# Patient Record
Sex: FEMALE | Race: WHITE | HISPANIC OR LATINO | Employment: UNEMPLOYED | URBAN - METROPOLITAN AREA
[De-identification: names, ages, dates, MRNs, and addresses within clinical notes are randomized per-mention and may not be internally consistent; named-entity substitution may affect disease eponyms.]

---

## 2017-01-24 ENCOUNTER — PROCEDURE VISIT (OUTPATIENT)
Dept: OBSTETRICS AND GYNECOLOGY | Facility: CLINIC | Age: 37
End: 2017-01-24
Payer: COMMERCIAL

## 2017-01-24 ENCOUNTER — OFFICE VISIT (OUTPATIENT)
Dept: OBSTETRICS AND GYNECOLOGY | Facility: CLINIC | Age: 37
End: 2017-01-24
Payer: COMMERCIAL

## 2017-01-24 VITALS
SYSTOLIC BLOOD PRESSURE: 106 MMHG | BODY MASS INDEX: 18.04 KG/M2 | HEIGHT: 61 IN | WEIGHT: 95.56 LBS | DIASTOLIC BLOOD PRESSURE: 68 MMHG

## 2017-01-24 DIAGNOSIS — N92.6 MISSED MENSES: ICD-10-CM

## 2017-01-24 DIAGNOSIS — Z01.419 ENCOUNTER FOR GYNECOLOGICAL EXAMINATION: ICD-10-CM

## 2017-01-24 DIAGNOSIS — Z3A.01 6 WEEKS GESTATION OF PREGNANCY: ICD-10-CM

## 2017-01-24 DIAGNOSIS — Z11.3 SCREENING FOR STD (SEXUALLY TRANSMITTED DISEASE): ICD-10-CM

## 2017-01-24 DIAGNOSIS — Z01.419 WELL FEMALE EXAM WITH ROUTINE GYNECOLOGICAL EXAM: Primary | ICD-10-CM

## 2017-01-24 DIAGNOSIS — Z32.01 ENCOUNTER FOR PREGNANCY TEST, RESULT POSITIVE: ICD-10-CM

## 2017-01-24 LAB
B-HCG UR QL: POSITIVE
CTP QC/QA: YES

## 2017-01-24 PROCEDURE — 88175 CYTOPATH C/V AUTO FLUID REDO: CPT

## 2017-01-24 PROCEDURE — 99385 PREV VISIT NEW AGE 18-39: CPT | Mod: S$GLB,,, | Performed by: OBSTETRICS & GYNECOLOGY

## 2017-01-24 PROCEDURE — 87591 N.GONORRHOEAE DNA AMP PROB: CPT

## 2017-01-24 PROCEDURE — 99203 OFFICE O/P NEW LOW 30 MIN: CPT | Mod: PN | Performed by: OBSTETRICS & GYNECOLOGY

## 2017-01-24 PROCEDURE — 81025 URINE PREGNANCY TEST: CPT | Mod: S$GLB,,, | Performed by: OBSTETRICS & GYNECOLOGY

## 2017-01-24 RX ORDER — VITAMIN MINERAL SUPPLEMENT 130; 92.4; 210; 1; 5; 5; 20; 7; 25; 300; 10; 800; 6.9; 1.3; 18.2 MG/1; MG/1; MG/1; MG/1; MG/1; MG/1; MG/1; MG/1; MG/1; UG/1; UG/1; UG/1; MG/1; MG/1; MG/1
CAPSULE ORAL
COMMUNITY
Start: 2017-01-11 | End: 2017-01-24 | Stop reason: SDUPTHER

## 2017-01-24 RX ORDER — HYDROCODONE BITARTRATE AND ACETAMINOPHEN 5; 325 MG/1; MG/1
TABLET ORAL
COMMUNITY
Start: 2016-11-19 | End: 2017-01-24

## 2017-01-24 RX ORDER — METHYLPREDNISOLONE 4 MG/1
TABLET ORAL
COMMUNITY
Start: 2016-12-05 | End: 2017-01-24

## 2017-01-24 RX ORDER — BUPROPION HYDROCHLORIDE 100 MG/1
TABLET, EXTENDED RELEASE ORAL
COMMUNITY
Start: 2017-01-11 | End: 2017-01-24

## 2017-01-24 RX ORDER — ALBUTEROL SULFATE 90 UG/1
AEROSOL, METERED RESPIRATORY (INHALATION)
COMMUNITY
Start: 2016-12-05 | End: 2017-01-24

## 2017-01-24 NOTE — LETTER
January 24, 2017      Catie Thompson MD  [address]  [facility]  [place] [address]           [facility]  [address]  [place] [address]  Phone: [phone]  Fax: [phone]          Patient: Lynne Webb   MR Number: [id number]   YOB: 1980   Date of Visit: 1/24/2017       Dear Dr. Catie Thompson:    Thank you for referring Lynne Webb to me for evaluation. Attached you will find relevant portions of my assessment and plan of care.    If you have questions, please do not hesitate to call me. I look forward to following Lynne Webb along with you.    Sincerely,    Indiana Oliver MD    Enclosure  CC:  No Recipients    If you would like to receive this communication electronically, please contact [email or web address] or [phone] to request more information on [employer] Link access.    For providers and/or their staff who would like to refer a patient to [facility], please contact us through our one-stop-shop provider referral line, [facility], at [phone].    If you feel you have received this communication in error or would no longer like to receive these types of communications, please e-mail [email or web address]

## 2017-01-24 NOTE — PROGRESS NOTES
Subjective:       Patient ID: Lynne Webb is a 36 y.o. female.    Chief Complaint:  Establish Care (pt received + UPT at home, last pap  normal per pt)      History of Present Illness.  Lynne Webb is a 36 y.o. female.  She has no breast or urinary symptoms.  She has no menorrhagia, bleeding betweeen menses, postcoital bleeding, dysmenorrhea, pelvic pain, dyspareunia, vaginal dryness, vaginal discharge, or sexual complaints.  EDC 17 EGA 6 2/7 weeks by period c/w U/S today.    GYN & OB History  Patient's last menstrual period was 2016 (approximate).   Date of Last Pap: No result found    OB History    Para Term  AB SAB TAB Ectopic Multiple Living   2 1 1       1      # Outcome Date GA Lbr José Miguel/2nd Weight Sex Delivery Anes PTL Lv   2 Current            1 Term  37w0d  2.637 kg (5 lb 13 oz) M Vag-Spont None  Y      Complications: Postpartum hemorrhage      Obstetric Comments   Postpartum depression - baby had bacteremia       Past Medical History   Diagnosis Date    Abnormal Pap smear of cervix      only abnormal 1 time      Past Surgical History   Procedure Laterality Date    [place] tooth extraction       Family History   Problem Relation Age of Onset    Stroke Maternal Grandmother     Coronary artery disease Maternal Grandmother     Hypertension Father     Heart attack Father     Hypertension Mother     Stroke Maternal Grandfather     Breast cancer Neg Hx     Colon cancer Neg Hx     Ovarian cancer Neg Hx      Social History   Substance Use Topics    Smoking status: Never Smoker    Smokeless tobacco: None    Alcohol use No       Current Outpatient Prescriptions:     PNV #15-iron fum,ps-folic acid (FOLIVANE-OB) 85-1 mg Cap, Take 1 tablet by mouth once daily., Disp: 30 capsule, Rfl: 11    Review of patient's allergies indicates:   Allergen Reactions    Shellfish containing products Swelling       Review of Systems  Review of Systems  "  Constitutional: Negative for fatigue.   HENT: Negative for trouble swallowing.    Eyes: Negative for visual disturbance.   Respiratory: Negative for cough and shortness of breath.    Cardiovascular: Negative for chest pain.   Gastrointestinal: Negative for abdominal distention, abdominal pain, blood in stool, nausea and vomiting.   Genitourinary: Negative for difficulty urinating, dyspareunia, dysuria, flank pain, frequency, hematuria, pelvic pain, urgency, vaginal bleeding, vaginal discharge and vaginal pain.   Musculoskeletal: Negative for arthralgias.   Skin: Negative for rash.   Neurological: Negative for dizziness and headaches.   Psychiatric/Behavioral: Negative for sleep disturbance. The patient is not nervous/anxious.         Objective:     Vitals:    01/24/17 0953   BP: 106/68   Weight: 43.3 kg (95 lb 9.1 oz)   Height: 5' 1" (1.549 m)   PainSc: 0-No pain     Body mass index is 18.06 kg/(m^2).      Physical Exam:   Constitutional: She is oriented to person, place, and time. Vital signs are normal. She appears well-developed and well-nourished.    HENT:   Head: Normocephalic.     Neck: Normal range of motion. No thyromegaly present.     Pulmonary/Chest: Right breast exhibits no mass, no nipple discharge, no skin change, no tenderness and no swelling. Left breast exhibits no mass, no nipple discharge, no skin change, no tenderness and no swelling. Breasts are symmetrical.        Abdominal: Soft. Normal appearance and bowel sounds are normal. She exhibits no distension. There is no tenderness.     Genitourinary: Vagina normal and uterus normal. Pelvic exam was performed with patient supine. There is no rash, tenderness, lesion or injury on the right labia. There is no rash, tenderness, lesion or injury on the left labia. Cervix is normal. Right adnexum displays no mass, no tenderness and no fullness. Left adnexum displays no mass, no tenderness and no fullness. No erythema in the vagina. No vaginal discharge " found. Cervix exhibits no motion tenderness and no discharge.           Musculoskeletal: Normal range of motion.      Lymphadenopathy:        Right: No inguinal and no supraclavicular adenopathy present.        Left: No inguinal and no supraclavicular adenopathy present.    Neurological: She is alert and oriented to person, place, and time.    Skin: Skin is warm and dry.    Psychiatric: She has a normal mood and affect.        Assessment/ Plan:     Well female exam with routine gynecological exam  -     Liquid-based pap smear, screening  -     C. trachomatis/N. gonorrhoeae by AMP DNA Cervicovaginal    Missed menses  -     POCT urine pregnancy  -     US OB/GYN Procedure ([facility]) - Extended List; Future  -     PNV #15-iron fum,ps-folic acid (FOLIVANE-OB) 85-1 mg Cap; Take 1 tablet by mouth once daily.  Dispense: 30 capsule; Refill: 11    Screening for STD (sexually transmitted disease)  -     Liquid-based pap smear, screening  -     C. trachomatis/N. gonorrhoeae by AMP DNA Cervicovaginal    Encounter for gynecological examination    Encounter for pregnancy test, result positive    6 weeks gestation of pregnancy    Declines CF.  Will consider Materna 21  Labs next visit.  U/S for dates today.  Routine pap smears.  Self breast exam  Follow-up with me in 1 month

## 2017-01-24 NOTE — MR AVS SNAPSHOT
[facility]  [address]  [place] [address]  Phone: [phone]  Fax: [phone]                  Lynne Webb   2017 9:45 AM   Office Visit    Description:  Female : 1980   Provider:  Indiana Oliver MD   Department:  [facility]           Reason for Visit     Establish Care           Diagnoses this Visit        Comments    Well female exam with routine gynecological exam    -  Primary     Missed menses         Screening for STD (sexually transmitted disease)         Encounter for gynecological examination         Encounter for pregnancy test, result positive         6 weeks gestation of pregnancy                To Do List           Future Appointments        Provider Department Dept Phone    2017 10:15 AM Indiana Oliver MD [facility] [phone]    3/22/2017 10:15 AM Indiana Oliver MD [facility] [phone]      Goals (5 Years of Data)     None      Follow-Up and Disposition     Return in about 1 month (around 2017).    Follow-up and Disposition History       These Medications        Disp Refills Start End    PNV #15-iron fum,ps-folic acid (FOLIVANE-OB) 85-1 mg Cap 30 capsule 11 2017    Take 1 tablet by mouth once daily. - Oral    Pharmacy: [facility] #[id number] - [place] [address] #: [phone]         [facility] On Call     [facility] On Call Nurse Care Line -  Assistance  Registered nurses in the [facility] On Call Center provide clinical advisement, health education, appointment booking, and other advisory services.  Call for this free service at [phone].             Medications           Message regarding Medications     Verify the changes and/or additions to your medication regime listed below are the same as discussed with your clinician today.  If any of these changes or additions are incorrect, please notify your healthcare provider.       "  START taking these NEW medications        Refills    PNV #15-iron fum,ps-folic acid (FOLIVANE-OB) 85-1 mg Cap 11    Sig: Take 1 tablet by mouth once daily.    Class: Normal    Route: Oral      STOP taking these medications     buPROPion (WELLBUTRIN SR) 100 MG TBSR 12 hr tablet     hydrocodone-acetaminophen 5-325mg (NORCO) 5-325 mg per tablet     methylPREDNISolone (MEDROL DOSEPACK) 4 mg tablet     PROAIR HFA 90 mcg/actuation inhaler            Verify that the below list of medications is an accurate representation of the medications you are currently taking.  If none reported, the list may be blank. If incorrect, please contact your healthcare provider. Carry this list with you in case of emergency.           Current Medications     PNV #15-iron fum,ps-folic acid (FOLIVANE-OB) 85-1 mg Cap Take 1 tablet by mouth once daily.           Clinical Reference Information           Vital Signs - Last Recorded  Most recent update: 1/24/2017  9:55 AM by Adelia Dejesus MA    BP Ht Wt LMP BMI    106/68 5' 1" (1.549 m) 43.3 kg (95 lb 9.1 oz) 12/11/2016 (Approximate) 18.06 kg/m2      Blood Pressure          Most Recent Value    BP  106/68      Allergies as of 1/24/2017     Shellfish Containing Products      Immunizations Administered on Date of Encounter - 1/24/2017     None      Orders Placed During Today's Visit      Normal Orders This Visit    C. trachomatis/N. gonorrhoeae by AMP DNA Cervicovaginal     Liquid-based pap smear, screening     POCT urine pregnancy     Future Labs/Procedures Expected by Expires    US OB/GYN Procedure ([facility]) - Extended List  As directed 1/24/2018 1/24/2017 10:11 AM - Adelia Dejesus MA      Component Results     Component Value Flag Ref Range Units Status    POC Preg Test, Ur Positive (A) Negative  Final     Acceptable Yes    Final      "

## 2017-01-24 NOTE — MR AVS SNAPSHOT
[facility]  [address]  [place] [address]  Phone: [phone]  Fax: [phone]                  Lynne Webb   2017 11:30 AM   Procedure visit    Description:  Female : 1980   Provider:  [facility]   Department:  [facility]           Diagnoses this Visit        Comments    Missed menses                To Do List           Future Appointments        Provider Department Dept Phone    2017 10:15 AM Indiana Oliver MD [facility] [phone]    3/22/2017 10:15 AM Indiana Oliver MD [facility] [phone]      Goals (5 Years of Data)     None      [facility] On Call     [facility] On Call Nurse [facility] Line -  Assistance  Registered nurses in the [facility] On Call Center provide clinical advisement, health education, appointment booking, and other advisory services.  Call for this free service at [phone].             Medications           Message regarding Medications     Verify the changes and/or additions to your medication regime listed below are the same as discussed with your clinician today.  If any of these changes or additions are incorrect, please notify your healthcare provider.             Verify that the below list of medications is an accurate representation of the medications you are currently taking.  If none reported, the list may be blank. If incorrect, please contact your healthcare provider. Carry this list with you in case of emergency.           Current Medications     PNV #15-iron fum,ps-folic acid (FOLIVANE-OB) 85-1 mg Cap Take 1 tablet by mouth once daily.           Clinical Reference Information           Vital Signs - Last Recorded     LMP                   2016 (Approximate)           Allergies as of 2017     Shellfish Containing Products      Immunizations Administered on Date of Encounter - 2017     None      Orders Placed During Today's Visit       Normal Orders This Visit    US OB/GYN Procedure ([facility]) - Extended List

## 2017-01-26 LAB
C TRACH DNA SPEC QL NAA+PROBE: NEGATIVE
N GONORRHOEA DNA SPEC QL NAA+PROBE: NEGATIVE

## 2017-01-31 ENCOUNTER — TELEPHONE (OUTPATIENT)
Dept: OBSTETRICS AND GYNECOLOGY | Facility: CLINIC | Age: 37
End: 2017-01-31

## 2017-01-31 DIAGNOSIS — B37.49 CANDIDIASIS OF UROGENITAL SITES: Primary | ICD-10-CM

## 2017-01-31 RX ORDER — TERCONAZOLE 8 MG/G
1 CREAM VAGINAL NIGHTLY
Qty: 20 G | Refills: 0 | Status: SHIPPED | OUTPATIENT
Start: 2017-01-31 | End: 2017-02-03

## 2017-01-31 NOTE — TELEPHONE ENCOUNTER
Informed pt of results and gave instructions for Rx. Pt verbalized understanding.   ----- Message from Indiana Oliver MD sent at 1/31/2017  4:14 PM CST -----  Call patient and tell her that her Pap smear and cultures are normal.  The pap did show yeast so I will send a prescription for terazol to use vaginally x 3 days.

## 2017-02-08 ENCOUNTER — TELEPHONE (OUTPATIENT)
Dept: OBSTETRICS AND GYNECOLOGY | Facility: CLINIC | Age: 37
End: 2017-02-08

## 2017-02-08 NOTE — TELEPHONE ENCOUNTER
Left detailed message that I spoke with Dr. Sanchez who recommends to just monitor, advised if the bleeding increases or she starts cramping to call back for an appt.

## 2017-02-08 NOTE — TELEPHONE ENCOUNTER
8 week OB  Has been using Terazol cream but stopped for 2 days when she left town.  She noticed brownish discharge when she stopped using the cream but after intercourse she had scant bright red vaginal bleeding.  No more red blood noted this AM just scant brown discharge.  No cramping.  Reassured her that spotting after intercourse is normal in pregnancy.  Offered appt with iveth, she doesn't want to come if she doesn't have to.

## 2017-02-11 PROCEDURE — 81025 URINE PREGNANCY TEST: CPT | Performed by: EMERGENCY MEDICINE

## 2017-02-11 PROCEDURE — 99284 EMERGENCY DEPT VISIT MOD MDM: CPT | Mod: 25

## 2017-02-12 ENCOUNTER — HOSPITAL ENCOUNTER (EMERGENCY)
Facility: OTHER | Age: 37
Discharge: HOME | End: 2017-02-12
Attending: EMERGENCY MEDICINE
Payer: COMMERCIAL

## 2017-02-12 VITALS
RESPIRATION RATE: 16 BRPM | BODY MASS INDEX: 17.75 KG/M2 | WEIGHT: 94 LBS | OXYGEN SATURATION: 99 % | DIASTOLIC BLOOD PRESSURE: 68 MMHG | HEART RATE: 80 BPM | TEMPERATURE: 98 F | SYSTOLIC BLOOD PRESSURE: 120 MMHG | HEIGHT: 61 IN

## 2017-02-12 DIAGNOSIS — O20.0 THREATENED ABORTION: Primary | ICD-10-CM

## 2017-02-12 LAB
ABO + RH BLD: NORMAL
B-HCG UR QL: POSITIVE
BILIRUB UR QL STRIP: NEGATIVE
BLD GP AB SCN CELLS X3 SERPL QL: NORMAL
CLARITY UR: CLEAR
COLOR UR: YELLOW
CTP QC/QA: YES
GLUCOSE UR QL STRIP: NEGATIVE
HCG INTACT+B SERPL-ACNC: NORMAL MIU/ML
HGB UR QL STRIP: ABNORMAL
INJECT RH IG VOL PATIENT: NORMAL ML
KETONES UR QL STRIP: NEGATIVE
LEUKOCYTE ESTERASE UR QL STRIP: ABNORMAL
MICROSCOPIC COMMENT: ABNORMAL
NITRITE UR QL STRIP: NEGATIVE
PH UR STRIP: 7 [PH] (ref 5–8)
PROT UR QL STRIP: NEGATIVE
RBC #/AREA URNS HPF: 3 /HPF (ref 0–4)
RH BLD: NORMAL
SP GR UR STRIP: <=1.005 (ref 1–1.03)
SQUAMOUS #/AREA URNS HPF: 5 /HPF
URN SPEC COLLECT METH UR: ABNORMAL
UROBILINOGEN UR STRIP-ACNC: NEGATIVE EU/DL
WBC #/AREA URNS HPF: 12 /HPF (ref 0–5)

## 2017-02-12 PROCEDURE — 86901 BLOOD TYPING SEROLOGIC RH(D): CPT

## 2017-02-12 PROCEDURE — 81000 URINALYSIS NONAUTO W/SCOPE: CPT

## 2017-02-12 PROCEDURE — 96372 THER/PROPH/DIAG INJ SC/IM: CPT

## 2017-02-12 PROCEDURE — 86900 BLOOD TYPING SEROLOGIC ABO: CPT

## 2017-02-12 PROCEDURE — 63600519 RHOGAM PHARM REV CODE 636 ALT 250 W HCPCS: Performed by: EMERGENCY MEDICINE

## 2017-02-12 PROCEDURE — 84702 CHORIONIC GONADOTROPIN TEST: CPT

## 2017-02-12 PROCEDURE — 86850 RBC ANTIBODY SCREEN: CPT

## 2017-02-12 RX ADMIN — HUMAN RHO(D) IMMUNE GLOBULIN 300 MCG: 300 INJECTION, SOLUTION INTRAMUSCULAR at 01:02

## 2017-02-12 NOTE — ED AVS SNAPSHOT
[facility]  [address]  [facility] [address]               Lynne Webb   2017 12:18 AM   ED    Description:  Female : 1980   Department:  [facility]           Your Care was Coordinated By:     Provider Role From To    Joshua Sheriff DO Attending Provider 17 0029 --      Reason for Visit     Vaginal Bleeding           Diagnoses this Visit        Comments    Threatened     -  Primary       ED Disposition     None           To Do List           Follow-up Information     Follow up with Indiana Oliver MD. Call in 1 day.    Specialties:  Obstetrics, Obstetrics and Gynecology    Contact information:    [address]  [address]  [facility] [address]  [phone]        [facility] On Call     [facility] On Call Nurse Care Line -  Assistance  Registered nurses in the [facility] On Call Center provide clinical advisement, health education, appointment booking, and other advisory services.  Call for this free service at [phone].             Medications           Message regarding Medications     Verify the changes and/or additions to your medication regime listed below are the same as discussed with your clinician today.  If any of these changes or additions are incorrect, please notify your healthcare provider.        These medications were administered today        Dose Freq    rho(d) immune globulin injection 300 mcg 300 mcg As needed (PRN)    Sig: Inject 2 mLs (300 mcg total) into the muscle as needed (Administer Rh Immune Globulin if the mother is Rh negative and the baby is Rh positive or unknown.).    Class: Normal    Route: Intramuscular           Verify that the below list of medications is an accurate representation of the medications you are currently taking.  If none reported, the list may be blank. If incorrect, please contact your healthcare provider. Carry this list with you in case of emergency.     "       Current Medications     PNV #15-iron fum,ps-folic acid (FOLIVANE-OB) 85-1 mg Cap Take 1 tablet by mouth once daily.    rho(d) immune globulin injection 300 mcg Inject 2 mLs (300 mcg total) into the muscle as needed (Administer Rh Immune Globulin if the mother is Rh negative and the baby is Rh positive or unknown.).           Clinical Reference Information           Your Vitals Were     BP Pulse Temp Resp Height Weight    117/67 (BP Location: Left arm, Patient Position: Sitting) 75 98 °F (36.7 °C) (Oral) 16 5' 1" (1.549 m) 42.6 kg (94 lb)    Last Period SpO2 BMI          2016 (Approximate) 100% 17.76 kg/m2        Allergies as of 2017        Reactions    Shellfish Containing Products Swelling      Immunizations Administered on Date of Encounter - 2017     Name Date Dose VIS Date Route    Rho (D) Immune Globulin - IM  Incomplete 300 mcg n/a Intramuscular      ED Micro, Lab, POCT     Start Ordered       Status Ordering Provider    17 0013 17 0012  hCG, quantitative, pregnancy  STAT      Final result     17 0013 17 0012  Urinalysis  STAT      Final result     17 0012 17 0012  Urinalysis Microscopic  Once      Final result     17 2329 17 2328  POCT urine pregnancy  Once      Final result       ED Imaging Orders     None      Discharge References/Attachments     POSSIBLE MISCARRIAGE (THREATENED ) (ENGLISH)      Your Scheduled Appointments     2017 10:15 AM CST   Routine Prenatal Visit with Indiana Oliver MD   [facility]    [address]  [place] [address]   [phone]            Mar 22, 2017 10:15 AM CDT   Routine Prenatal Visit with MD Nishant Askew  [facility]    [address]  [place] [address]   [phone]            2017 10:15 AM CDT   Routine Prenatal Visit with Indiana Oliver MD "   [facility] [place]    [address]  [place] [address] [phone]               [facility] complies with applicable Federal civil rights laws and does not discriminate on the basis of race, color, national origin, age, disability, or sex.        Language Assistance Services     ATTENTION: Language assistance services are available, free of charge. Please call [phone].      ATENCIÓN: Si habla español, tiene a narvaez disposición servicios gratuitos de asistencia lingüística. Llame al [phone].     CHÚ Ý: N?u b?n nói Ti?ng Vi?t, có các d?ch v? h? tr? ngôn ng? mi?n phí dành cho b?n. G?i s? [phone].

## 2017-02-12 NOTE — ED TRIAGE NOTES
Pt c/o vaginal bleeding about an hour PTA.  States she is 8 weeks pregnant.  Reports some cramping.  States she used one pad since bleeding started.  Reports bright red blood.

## 2017-02-12 NOTE — ED PROVIDER NOTES
"Encounter Date: 2017    SCRIBE #1 NOTE: I, Blanca Alvarez, am scribing for, and in the presence of, Dr. Sheriff.       History     Chief Complaint   Patient presents with    Vaginal Bleeding     about 8 weeks pregnant, started bleeding at about 8:30 some cramping     Review of patient's allergies indicates:   Allergen Reactions    Shellfish containing products Swelling     HPI Comments:   Time seen by provider: 12:36 AM    The patient is a 36 y.o. female with  who presents to the ED 8 weeks pregnant with an acute onset of vaginal bleeding. The symptoms began 4 hours ago. She described the bleeding as "a little like a period but more than spotting." The patient took 2 Tylenol with no relief in symptoms. This is the first bleeding during her pregnancy. She has a 1 y.o. that she picks up. Otherwise, she denies any heavy lifting. She was recently prescribed a vaginal cream for a yeast infection where she saw some spotting for the first time but not as significant as this. Her ObGyn is Dr. Indiana Oliver, whom she contacted prior and referred her to the ER. The patient denies any other symptoms at this time. No pertinent SHx noted. No known drug allergies noted.    The history is provided by the patient.     Past Medical History   Diagnosis Date    Abnormal Pap smear of cervix      only abnormal 1 time      No past medical history pertinent negatives.  Past Surgical History   Procedure Laterality Date    [place] tooth extraction       Family History   Problem Relation Age of Onset    Stroke Maternal Grandmother     Coronary artery disease Maternal Grandmother     Hypertension Father     Heart attack Father     Hypertension Mother     Stroke Maternal Grandfather     Breast cancer Neg Hx     Colon cancer Neg Hx     Ovarian cancer Neg Hx      Social History   Substance Use Topics    Smoking status: Never Smoker    Smokeless tobacco: None    Alcohol use No     Review of Systems "   Constitutional: Negative for chills and fever.   HENT: Negative for congestion, rhinorrhea, sneezing and sore throat.    Eyes: Negative for visual disturbance.   Respiratory: Negative for cough and shortness of breath.    Cardiovascular: Negative for chest pain and palpitations.   Gastrointestinal: Negative for abdominal pain, diarrhea, nausea and vomiting.   Genitourinary: Negative for dysuria and hematuria.   Musculoskeletal: Negative for back pain and neck pain.   Skin: Negative for rash.   Neurological: Negative for seizures, syncope and headaches.     Physical Exam   Initial Vitals   BP Pulse Resp Temp SpO2   02/11/17 2319 02/11/17 2319 02/11/17 2319 02/11/17 2319 02/11/17 2319   117/67 75 16 98 °F (36.7 °C) 100 %     Physical Exam    Nursing note and vitals reviewed.  Constitutional: She appears well-developed and well-nourished.  Non-toxic appearance. She does not have a sickly appearance. No distress.   HENT:   Head: Normocephalic and atraumatic.   Mouth/Throat: Oropharynx is clear and moist.   Eyes: Conjunctivae, EOM and lids are normal. Pupils are equal, round, and reactive to light. Right eye exhibits no nystagmus. Left eye exhibits no nystagmus.   Neck: Trachea normal, normal range of motion and phonation normal. Neck supple.   Cardiovascular: Normal rate, regular rhythm and normal heart sounds. Exam reveals no gallop and no friction rub.    No murmur heard.  Abdominal: Soft. Normal appearance and bowel sounds are normal. There is no tenderness. There is no rigidity, no rebound, no guarding, no tenderness at McBurney's point and negative Augustin's sign.   Genitourinary:   Genitourinary Comments: Mild amount of bleeding from os. Os is close. No POC.    Neurological: She is alert and oriented to person, place, and time. She has normal strength and normal reflexes. She displays normal reflexes. No cranial nerve deficit or sensory deficit. She displays a negative Romberg sign.   Skin: Skin is warm, dry and  intact. No rash noted. No pallor.       ED Course   Procedures  Labs Reviewed   URINALYSIS - Abnormal; Notable for the following:        Result Value    Specific Gravity, UA <=1.005 (*)     Occult Blood UA 3+ (*)     Leukocytes, UA Trace (*)     All other components within normal limits   URINALYSIS MICROSCOPIC - Abnormal; Notable for the following:     WBC, UA 12 (*)     All other components within normal limits   POCT URINE PREGNANCY - Abnormal; Notable for the following:     POC Preg Test, Ur Positive (*)     All other components within normal limits   HCG, QUANTITATIVE, PREGNANCY   TYPE & SCREEN   RH TYPING   POST PARTUM TYPE AND SCREEN   CARLY - [facility] (FETAL BLEED SCREEN)   PREPARE RH IMMUNE GLOBULIN           Medical Decision Making:   History:   Old Medical Records: I decided to obtain old medical records.  Clinical Tests:   Lab Tests: Reviewed and Ordered  ED Management:  Lynne Webb is a 36 y.o. female Who is  2 para 1.  Complaining of lower abdominal cramping and vaginal bleeding.  Concern for threatened .  Patient has a previously confirm ultrasound confirming IUP.  Rh- so needs RhoGAM at this time.  Patient otherwise stable.  We'll discharge home to follow up with obstetrician.  I did speak with Dr. Santana.  I have explained to the patient about threatened  in layman's terms.  Patient discharged home in stable condition. Diagnosis and treatment plan explained to patient. I have answered all questions and the patient is satisfied with the plan of care.             Scribe Attestation:   Scribe #1: I performed the above scribed service and the documentation accurately describes the services I performed. I attest to the accuracy of the note.    Attending Attestation:           Physician Attestation for Scribe:  Physician Attestation Statement for Scribe #1: I, Dr. Sheriff, reviewed documentation, as scribed by Blanca Alvarez in my presence, and it is both accurate and  complete.         Attending ED Notes:   1:30 AM  Spoke with Dr. Hitchcock.          ED Course     Clinical Impression:     1. Threatened           Disposition:   Disposition: Discharged  Condition: Stable       Joshua Sheriff, DO  [id number]

## 2017-02-13 ENCOUNTER — PROCEDURE VISIT (OUTPATIENT)
Dept: OBSTETRICS AND GYNECOLOGY | Facility: CLINIC | Age: 37
End: 2017-02-13
Attending: OBSTETRICS & GYNECOLOGY
Payer: COMMERCIAL

## 2017-02-13 ENCOUNTER — TELEPHONE (OUTPATIENT)
Dept: OBSTETRICS AND GYNECOLOGY | Facility: CLINIC | Age: 37
End: 2017-02-13

## 2017-02-13 ENCOUNTER — LAB VISIT (OUTPATIENT)
Dept: LAB | Facility: OTHER | Age: 37
End: 2017-02-13
Attending: OBSTETRICS & GYNECOLOGY
Payer: COMMERCIAL

## 2017-02-13 VITALS — SYSTOLIC BLOOD PRESSURE: 118 MMHG | DIASTOLIC BLOOD PRESSURE: 72 MMHG | WEIGHT: 95.56 LBS | BODY MASS INDEX: 18.06 KG/M2

## 2017-02-13 DIAGNOSIS — O20.9 VAGINAL BLEEDING IN PREGNANCY, FIRST TRIMESTER: ICD-10-CM

## 2017-02-13 DIAGNOSIS — O20.9 VAGINAL BLEEDING IN PREGNANCY, FIRST TRIMESTER: Primary | ICD-10-CM

## 2017-02-13 DIAGNOSIS — O03.4 INCOMPLETE ABORTION: ICD-10-CM

## 2017-02-13 DIAGNOSIS — O03.4 INCOMPLETE ABORTION: Primary | ICD-10-CM

## 2017-02-13 LAB
BASOPHILS # BLD AUTO: 0.03 K/UL
BASOPHILS NFR BLD: 0.3 %
DIFFERENTIAL METHOD: ABNORMAL
EOSINOPHIL # BLD AUTO: 0.3 K/UL
EOSINOPHIL NFR BLD: 3.2 %
ERYTHROCYTE [DISTWIDTH] IN BLOOD BY AUTOMATED COUNT: 13.8 %
HCT VFR BLD AUTO: 35.7 %
HGB BLD-MCNC: 11.8 G/DL
LYMPHOCYTES # BLD AUTO: 2 K/UL
LYMPHOCYTES NFR BLD: 20.9 %
MCH RBC QN AUTO: 28.8 PG
MCHC RBC AUTO-ENTMCNC: 33.1 %
MCV RBC AUTO: 87 FL
MONOCYTES # BLD AUTO: 0.5 K/UL
MONOCYTES NFR BLD: 5.2 %
NEUTROPHILS # BLD AUTO: 6.8 K/UL
NEUTROPHILS NFR BLD: 70.2 %
PLATELET # BLD AUTO: 271 K/UL
PMV BLD AUTO: 9.1 FL
RBC # BLD AUTO: 4.1 M/UL
WBC # BLD AUTO: 9.74 K/UL

## 2017-02-13 PROCEDURE — 76817 TRANSVAGINAL US OBSTETRIC: CPT | Mod: S$GLB,,, | Performed by: PEDIATRICS

## 2017-02-13 PROCEDURE — 85025 COMPLETE CBC W/AUTO DIFF WBC: CPT

## 2017-02-13 PROCEDURE — 99213 OFFICE O/P EST LOW 20 MIN: CPT | Mod: S$GLB,,, | Performed by: OBSTETRICS & GYNECOLOGY

## 2017-02-13 PROCEDURE — 36415 COLL VENOUS BLD VENIPUNCTURE: CPT

## 2017-02-13 PROCEDURE — 99999 PR PBB SHADOW E&M-EST. PATIENT-LVL II: CPT | Mod: PBBFAC,,, | Performed by: OBSTETRICS & GYNECOLOGY

## 2017-02-13 NOTE — MR AVS SNAPSHOT
[facility]  [address]  [address]  [facility] [address]  Phone: [phone]  Fax: [phone]                  Lynne Webb   2017 10:45 AM   Routine Prenatal    Description:  Female : 1980   Provider:  Indiana Oliver MD   [facility]           Reason for Visit     Vaginal Bleeding           Diagnoses this Visit        Comments    Incomplete     -  Primary            To Do List           Future Appointments        Provider Department Dept Phone    2017 12:30 PM LAB, [facility] [phone]    2017 10:15 AM Indiana Oliver MD [facility] [phone]    2017 2:00 PM [facility], WOMEN'S ULTRASOUND [facility] Ultrasound [phone]    2017 3:00 PM Indiana Oliver MD [facility] [phone]    3/22/2017 10:15 AM Indiana Oliver MD [facility] [phone]      Goals (5 Years of Data)     None      Follow-Up and Disposition     Return in about 1 week (around 2017).    Follow-up and Disposition History      [facility]     [facility] Nurse Care [facility] -  Assistance  Registered nurses in the [facility] provide clinical advisement, health education, appointment booking, and other advisory services.  Call for this free service at [phone].             Medications           Message regarding Medications     Verify the changes and/or additions to your medication regime listed below are the same as discussed with your clinician today.  If any of these changes or additions are incorrect, please notify your healthcare provider.             Verify that the below list of medications is an accurate representation of the medications you are currently taking.  If none reported, the list may be blank. If incorrect, please contact your healthcare provider. Carry this list with you in case of emergency.           Current  Medications     PNV #15-iron fum,ps-folic acid (FOLIVANE-OB) 85-1 mg Cap Take 1 tablet by mouth once daily.           Clinical Reference Information           Prenatal Vitals     Enc. Date GA Prenatal Vitals Prenatal Pulse Pain Level Urine Albumin/Glucose Edema Presentation Dilation/Effacement/Station    2/13/17  118/72 / 43.3 kg (95 lb 9.1 oz)  /  / Absent  3 Negative / Negative         Your Vitals Were     BP Weight Last Period BMI       118/72 43.3 kg (95 lb 9.1 oz) 12/11/2016 (Approximate) 18.06 kg/m2       Allergies as of 2/13/2017     Shellfish Containing Products      Immunizations Administered on Date of Encounter - 2/13/2017     None      Orders Placed During Today's Visit     Future Labs/Procedures Expected by Expires    CBC auto differential  2/13/2017 2/13/2018    US Pelvis Comp with Transvag NON-OB (xpd  2/13/2017 2/13/2018      Language Assistance Services     ATTENTION: Language assistance services are available, free of charge. Please call [phone].      ATENCIÓN: Si habla español, tiene a narvaez disposición servicios gratuitos de asistencia lingüística. Llame al [phone].     LINDA Ý: N?u b?n nói Ti?ng Vi?t, có các d?ch v? h? tr? ngôn ng? mi?n phí dành cho b?n. G?i s? [phone].         [facility] complies with applicable Federal civil rights laws and does not discriminate on the basis of race, color, national origin, age, disability, or sex.

## 2017-02-13 NOTE — TELEPHONE ENCOUNTER
Melody 8wk ob pt started bleeding Saturday night and went to the ER. They told her she was ab negative and gave her rhogam. States that she is still bleeding and having some cramping as well. She says that she is bleeding a little bit more than yesterday. Scheduled her to see Dr. Oliver today at 10:45 with an U/S.

## 2017-02-13 NOTE — PROGRESS NOTES
Patient went to ER with mild bleeding on 2/11/17.  Was diagnosed with a threatened ab and sent home after being given Rhogam.  Bleeding yesterday and today is like a period. U/S today shows no FHTs.  Pelvic:  Uterus nontender.  Cervix closed.  Options of expectant management, cytotec, and D&C discessed.  They would like expectant management.  Precautions given and CBC with diff ordered.

## 2017-02-13 NOTE — PROCEDURES
Procedures       Indication: 1st trimester bleeding (GÓMEZ SWING).   Maternal age (36 years).   ____________________________________________________________________________  History:     Age: 36 years. Maternal age at EDC: 37 years. : 2 Para: 1. LMP not sure.     Obstetric History:     Mode of last delivery: Vaginal Delivery @ 37 weeks.  ____________________________________________________________________________  Dating:    LMP: 16 EDC: 17 GA by LMP: 9w1d  Current Scan on: 17 EDC: 10/08/17 GA by current scan: 6w1d      Best Overall Assessment: 17 EDC: 17 Assessed GA: 9w1d  The calculation of the gestational age by current scan was based on CRL.  The Best Overall Assessment is based on the LMP.  ____________________________________________________________________________  Early Pregnancy Assessment:    Biometry:  CRL 4.2 mm <5th% 6w1d (5w6d to 6w2d)  Gestational Sac present. Yolk Sac present. Embryo present.     Heart activity: absent.     Other Findings:     Swing notified of ultrasound findings.   Ëmbryonic demise.     ____________________________________________________________________________  Maternal Structures:    Uterus: normal.  Cervix: normal.  Right Ovary: normal.   Right Ovary size: 26 mm x 22 mm x 10 mm. Volume: 3.0 ml.   Left Ovary: normal.   Left Ovary size: 39 mm x 34 mm x 29 mm. Volume: 20.1 ml.   Corpus Luteum Left Ovary: 28 mm x 25 mm x 32 mm.  Cul de Sac / Pouch of Dhruv: no free fluid visible.  ____________________________________________________________________________  Report Summary:    Impression:     Findings given to primary OB.    No FHR noted.  C/w embryonic demise.     Recommendations:     Suggest repeat scan as you feel clinically indicated.

## 2017-02-14 ENCOUNTER — TELEPHONE (OUTPATIENT)
Dept: OBSTETRICS AND GYNECOLOGY | Facility: CLINIC | Age: 37
End: 2017-02-14

## 2017-02-14 DIAGNOSIS — O03.9 SAB (SPONTANEOUS ABORTION): Primary | ICD-10-CM

## 2017-02-14 DIAGNOSIS — R10.2 PELVIC PAIN: Primary | ICD-10-CM

## 2017-02-14 RX ORDER — OXYCODONE AND ACETAMINOPHEN 5; 325 MG/1; MG/1
1 TABLET ORAL EVERY 6 HOURS PRN
Qty: 20 TABLET | Refills: 0 | Status: SHIPPED | OUTPATIENT
Start: 2017-02-14 | End: 2018-02-14

## 2017-02-14 RX ORDER — IBUPROFEN 600 MG/1
600 TABLET ORAL EVERY 6 HOURS PRN
Qty: 60 TABLET | Refills: 1 | Status: SHIPPED | OUTPATIENT
Start: 2017-02-14

## 2017-02-14 NOTE — TELEPHONE ENCOUNTER
----- Message from Indiana Oliver MD sent at 2/14/2017 10:54 AM CST -----  Call pt.  She is only mildly anemic.  Take fergon over the counter daily

## 2017-02-14 NOTE — TELEPHONE ENCOUNTER
Informed pt that Ibuprofen and Oxycodone have been sent to the pharmacy, gave instructions for taking RX. Pt verbalized understanding.

## 2017-02-14 NOTE — TELEPHONE ENCOUNTER
Informed pt of results and instructions for OTC medication. Pt verbalized understanding. Pt states that she passed the fetus last night. Vaginal bleeding has lessened. Pt is still experiencing pain, would like to know if something can be called in for her if pain continues. Pharmacy is [facility].

## 2017-02-22 ENCOUNTER — PROCEDURE VISIT (OUTPATIENT)
Dept: OBSTETRICS AND GYNECOLOGY | Facility: CLINIC | Age: 37
End: 2017-02-22
Payer: COMMERCIAL

## 2017-02-22 ENCOUNTER — OFFICE VISIT (OUTPATIENT)
Dept: OBSTETRICS AND GYNECOLOGY | Facility: CLINIC | Age: 37
End: 2017-02-22
Payer: COMMERCIAL

## 2017-02-22 VITALS
BODY MASS INDEX: 18.62 KG/M2 | HEIGHT: 61 IN | DIASTOLIC BLOOD PRESSURE: 76 MMHG | SYSTOLIC BLOOD PRESSURE: 120 MMHG | WEIGHT: 98.63 LBS

## 2017-02-22 DIAGNOSIS — O03.9 SAB (SPONTANEOUS ABORTION): ICD-10-CM

## 2017-02-22 DIAGNOSIS — O03.9 COMPLETE ABORTION: Primary | ICD-10-CM

## 2017-02-22 PROCEDURE — 99213 OFFICE O/P EST LOW 20 MIN: CPT | Mod: S$GLB,,, | Performed by: OBSTETRICS & GYNECOLOGY

## 2017-02-22 PROCEDURE — 99999 PR PBB SHADOW E&M-EST. PATIENT-LVL III: CPT | Mod: PBBFAC,,, | Performed by: OBSTETRICS & GYNECOLOGY

## 2017-02-22 PROCEDURE — 76817 TRANSVAGINAL US OBSTETRIC: CPT | Mod: S$GLB,,, | Performed by: OBSTETRICS & GYNECOLOGY

## 2017-02-22 PROCEDURE — 1160F RVW MEDS BY RX/DR IN RCRD: CPT | Mod: S$GLB,,, | Performed by: OBSTETRICS & GYNECOLOGY

## 2017-02-22 NOTE — PROGRESS NOTES
Patient is a 36 y.o. female who is here for follow-up of incomplete .  She was bleeding the past weekend.  I saw her on 17 and there was an IUP with no FHTs.  She passed the embryo later that night.  The bleeding stopped this weekend.  Today the ultrasound shows an empty cavity with no retained products. Patient's last menstrual period was 2016 (approximate).  She is AB negative and received rhogam in ER on 17.    Past Medical History   Diagnosis Date    Abnormal Pap smear of cervix      only abnormal 1 time      Past Surgical History   Procedure Laterality Date    [place] tooth extraction       Family History   Problem Relation Age of Onset    Stroke Maternal Grandmother     Coronary artery disease Maternal Grandmother     Hypertension Father     Heart attack Father     Hypertension Mother     Stroke Maternal Grandfather     Breast cancer Neg Hx     Colon cancer Neg Hx     Ovarian cancer Neg Hx      Social History     Social History    Marital status:      Spouse name: N/A    Number of children: N/A    Years of education: N/A     Social History Main Topics    Smoking status: Never Smoker    Smokeless tobacco: None    Alcohol use No    Drug use: No    Sexual activity: Yes     Partners: Male     Birth control/ protection: None     Other Topics Concern    None     Social History Narrative     Review of patient's allergies indicates:   Allergen Reactions    Shellfish containing products Swelling     Current Outpatient Prescriptions on File Prior to Visit   Medication Sig Dispense Refill    ibuprofen (ADVIL,MOTRIN) 600 MG tablet Take 1 tablet (600 mg total) by mouth every 6 (six) hours as needed for Pain. 60 tablet 1    PNV #15-iron fum,ps-folic acid (FOLIVANE-OB) 85-1 mg Cap Take 1 tablet by mouth once daily. 30 capsule 11    oxycodone-acetaminophen (ROXICET) 5-325 mg per tablet Take 1 tablet by mouth every 6 (six) hours as needed for Pain. 20 tablet 0  "    No current facility-administered medications on file prior to visit.        ROS:  GENERAL: No fever, chills, fatigability or weight loss.  VULVAR: No pain, no lesions and no itching.  VAGINAL: No relaxation, no itching, no discharge, no abnormal bleeding and no lesions.  ABDOMEN: No abdominal pain. Denies nausea. Denies vomiting. No diarrhea. No constipation  BREAST: Denies pain. No lumps. No discharge.  URINARY: No incontinence, no nocturia, no frequency and no dysuria.  CARDIOVASCULAR: No chest pain. No shortness of breath. No leg cramps.  NEUROLOGICAL: no headaches. No vision changes.    Vitals:    17 1428   BP: 120/76   Weight: 46.3 kg (102 lb 2.9 oz)   Height: 5' 1" (1.549 m)   PainSc: 0-No pain     Body mass index is 19.31 kg/(m^2).    PHYSICAL EXAM:  Well developed, well nourished  Abdomen is soft, nontender, nondistended, positive bowel sounds, no rebound or guarding.  External genitalia and urethra within normal limits.   Vagina without lesions or discharge.    Cervix without lesions, discharge, or cervical motion tenderness.   Uterus normal in size and nontender. No adnexal masses or tenderness palpated.      Assessment and Plan  Complete         I spent 15 minutes face to face with the patient today.  Recommend having 1 period and then Ok to try again.  Continue PNV.  "

## 2017-02-22 NOTE — MR AVS SNAPSHOT
[facility]  [address]  [place] [address]  Phone: [phone]  Fax: [phone]                  Lynne Webb   2017 3:00 PM   Office Visit    Description:  Female : 1980   Provider:  Indiana Oliver MD   Department:  [facility]           Reason for Visit     Follow-up           Diagnoses this Visit        Comments    Complete     -  Primary            To Do List           Goals (5 Years of Data)     None      Follow-Up and Disposition     Return if symptoms worsen or fail to improve.    Follow-up and Disposition History      [facility]     [facility] Care [facility] -  Assistance  Registered nurses in the [facility] provide clinical advisement, health education, appointment booking, and other advisory services.  Call for this free service at [phone].             Medications           Message regarding Medications     Verify the changes and/or additions to your medication regime listed below are the same as discussed with your clinician today.  If any of these changes or additions are incorrect, please notify your healthcare provider.             Verify that the below list of medications is an accurate representation of the medications you are currently taking.  If none reported, the list may be blank. If incorrect, please contact your healthcare provider. Carry this list with you in case of emergency.           Current Medications     ibuprofen (ADVIL,MOTRIN) 600 MG tablet Take 1 tablet (600 mg total) by mouth every 6 (six) hours as needed for Pain.    oxycodone-acetaminophen (ROXICET) 5-325 mg per tablet Take 1 tablet by mouth every 6 (six) hours as needed for Pain.    PNV #15-iron fum,ps-folic acid (FOLIVANE-OB) 85-1 mg Cap Take 1 tablet by mouth once daily.           Clinical Reference Information           Prenatal Vitals     Enc. Date GA Prenatal Vitals Prenatal Pulse Pain Level Urine  "Albumin/Glucose Edema Presentation Dilation/Effacement/Station    2/22/17  120/76 / 46.3 kg (102 lb 2.9 oz)           2/13/17  118/72 / 43.3 kg (95 lb 9.1 oz)  /  / Absent  3 Negative / Negative          Height: 5' 1" (1.549 m)       Your Vitals Were     BP Height Weight Last Period BMI    120/76 5' 1" (1.549 m) 46.3 kg (102 lb 2.9 oz) 12/11/2016 (Approximate) 19.31 kg/m2      Blood Pressure          Most Recent Value    BP  120/76      Allergies as of 2/22/2017     Shellfish Containing Products      Immunizations Administered on Date of Encounter - 2/22/2017     None      Language Assistance Services     ATTENTION: Language assistance services are available, free of charge. Please call [phone].      ATENCIÓN: Si guillaume finch, tiene a narvaez disposición servicios gratuitos de asistencia lingüística. Llame al [phone].     CHÚ Ý: N?u b?n nói Ti?ng Vi?t, có các d?ch v? h? tr? ngôn ng? mi?n phí dành cho b?n. G?i s? [phone].         [facility] complies with applicable Federal civil rights laws and does not discriminate on the basis of race, color, national origin, age, disability, or sex.        "

## 2017-02-22 NOTE — PROCEDURES
Procedures   Obstetrical ultrasound completed today.  See report in imaging section of [facility].

## 2017-02-23 ENCOUNTER — TELEPHONE (OUTPATIENT)
Dept: OBSTETRICS AND GYNECOLOGY | Facility: CLINIC | Age: 37
End: 2017-02-23

## 2017-03-10 DIAGNOSIS — N92.6 MISSED MENSES: ICD-10-CM

## 2017-03-10 NOTE — TELEPHONE ENCOUNTER
Left voicemail for pt to call back and clarify. Did pharmacy need a different PNV called in or they didn't have any refills on file for her.

## 2017-03-10 NOTE — TELEPHONE ENCOUNTER
Pt states the PNV was sent to pharmacy in January, pt went to pick it up now and now they don't have it on file. Pt is requesting that her Rx please be sent again. Informed pt that Dr. Oliver would be back in office on Monday, pt verbalized understanding.